# Patient Record
Sex: FEMALE | Race: WHITE | NOT HISPANIC OR LATINO | URBAN - METROPOLITAN AREA
[De-identification: names, ages, dates, MRNs, and addresses within clinical notes are randomized per-mention and may not be internally consistent; named-entity substitution may affect disease eponyms.]

---

## 2017-02-20 ENCOUNTER — EMERGENCY (EMERGENCY)
Facility: HOSPITAL | Age: 82
LOS: 0 days | Discharge: HOME | End: 2017-02-20

## 2017-06-27 DIAGNOSIS — E78.00 PURE HYPERCHOLESTEROLEMIA, UNSPECIFIED: ICD-10-CM

## 2017-06-27 DIAGNOSIS — F03.90 UNSPECIFIED DEMENTIA, UNSPECIFIED SEVERITY, WITHOUT BEHAVIORAL DISTURBANCE, PSYCHOTIC DISTURBANCE, MOOD DISTURBANCE, AND ANXIETY: ICD-10-CM
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
## 2017-06-27 DIAGNOSIS — S52.512A DISPLACED FRACTURE OF LEFT RADIAL STYLOID PROCESS, INITIAL ENCOUNTER FOR CLOSED FRACTURE: ICD-10-CM
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
## 2017-06-27 DIAGNOSIS — M25.432 EFFUSION, LEFT WRIST: ICD-10-CM

## 2017-06-27 DIAGNOSIS — Z88.0 ALLERGY STATUS TO PENICILLIN: ICD-10-CM

## 2017-06-27 DIAGNOSIS — S52.612A DISPLACED FRACTURE OF LEFT ULNA STYLOID PROCESS, INITIAL ENCOUNTER FOR CLOSED FRACTURE: ICD-10-CM
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
## 2019-09-21 ENCOUNTER — EMERGENCY (EMERGENCY)
Facility: HOSPITAL | Age: 84
LOS: 0 days | Discharge: HOME | End: 2019-09-22
Attending: EMERGENCY MEDICINE | Admitting: EMERGENCY MEDICINE
Payer: MEDICARE

## 2019-09-21 VITALS
HEIGHT: 61 IN | TEMPERATURE: 98 F | DIASTOLIC BLOOD PRESSURE: 69 MMHG | WEIGHT: 139.99 LBS | HEART RATE: 86 BPM | OXYGEN SATURATION: 95 % | SYSTOLIC BLOOD PRESSURE: 161 MMHG

## 2019-09-21 DIAGNOSIS — G30.9 ALZHEIMER'S DISEASE, UNSPECIFIED: ICD-10-CM

## 2019-09-21 DIAGNOSIS — F02.80 DEMENTIA IN OTHER DISEASES CLASSIFIED ELSEWHERE, UNSPECIFIED SEVERITY, WITHOUT BEHAVIORAL DISTURBANCE, PSYCHOTIC DISTURBANCE, MOOD DISTURBANCE, AND ANXIETY: ICD-10-CM
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
## 2019-09-21 DIAGNOSIS — Z88.0 ALLERGY STATUS TO PENICILLIN: ICD-10-CM
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
## 2019-09-21 DIAGNOSIS — S00.01XA ABRASION OF SCALP, INITIAL ENCOUNTER: ICD-10-CM

## 2019-09-21 DIAGNOSIS — Z23 ENCOUNTER FOR IMMUNIZATION: ICD-10-CM
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
## 2019-09-21 DIAGNOSIS — S09.90XA UNSPECIFIED INJURY OF HEAD, INITIAL ENCOUNTER: ICD-10-CM
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
## 2019-09-21 PROCEDURE — 72170 X-RAY EXAM OF PELVIS: CPT | Mod: 26

## 2019-09-21 PROCEDURE — 70450 CT HEAD/BRAIN W/O DYE: CPT | Mod: 26

## 2019-09-21 PROCEDURE — 99284 EMERGENCY DEPT VISIT MOD MDM: CPT

## 2019-09-21 PROCEDURE — 72125 CT NECK SPINE W/O DYE: CPT | Mod: 26

## 2019-09-21 PROCEDURE — 71045 X-RAY EXAM CHEST 1 VIEW: CPT | Mod: 26

## 2019-09-21 RX ORDER — TETANUS TOXOID, REDUCED DIPHTHERIA TOXOID AND ACELLULAR PERTUSSIS VACCINE, ADSORBED 5; 2.5; 8; 8; 2.5 [IU]/.5ML; [IU]/.5ML; UG/.5ML; UG/.5ML; UG/.5ML
0.5 SUSPENSION INTRAMUSCULAR ONCE
Refills: 0 | Status: COMPLETED | OUTPATIENT
Start: 2019-09-21 | End: 2019-09-21

## 2019-09-21 RX ADMIN — TETANUS TOXOID, REDUCED DIPHTHERIA TOXOID AND ACELLULAR PERTUSSIS VACCINE, ADSORBED 0.5 MILLILITER(S): 5; 2.5; 8; 8; 2.5 SUSPENSION INTRAMUSCULAR at 21:24

## 2019-09-21 NOTE — ED PROVIDER NOTE - PHYSICAL EXAMINATION
CONST: chronically ill appearing.   HEAD: + 1 cm laceration right parietal scalp, no FB, no step off or deformity, no raccoon eyes, negative pelaez's sign.   EYES: Sclera and conjunctiva clear.  NECK: No midline C/T/L tenderness  CARD: Normal S1 S2; Normal rate and rhythm  RESP: Equal BS B/L, No wheezes, rhonchi or rales. No distress  GI: Soft, non-tender, non-distended.  MS: no TTP of extremities/back/ribs, no signs of trauma.  Normal ROM in all extremities. No edema of lower extremities, no calf pain, radial pulses 2+ bilaterally  SKIN: Warm, dry, no acute rashes. Good turgor  NEURO: non-verbal.  not interactive. at baseline per family. CONST: chronically ill appearing.   HEAD: + 1 cm abrasion right parietal scalp, no FB, no step off or deformity, no raccoon eyes, negative pelaez's sign.   EYES: Sclera and conjunctiva clear.  NECK: No midline C/T/L tenderness  CARD: Normal S1 S2; Normal rate and rhythm  RESP: Equal BS B/L, No wheezes, rhonchi or rales. No distress  GI: Soft, non-tender, non-distended.  MS: no TTP of extremities/back/ribs, no signs of trauma.  Normal ROM in all extremities. No edema of lower extremities, no calf pain, radial pulses 2+ bilaterally  SKIN: Warm, dry, no acute rashes. Good turgor  NEURO: non-verbal.  not interactive. at baseline per family.

## 2019-09-21 NOTE — ED PROVIDER NOTE - OBJECTIVE STATEMENT
86 y.o female w/ hx of alzheimer's, HLD presents to the ED for evaluation of fall.  Pt was being transferred to toilet, fell and hit right side of head with no LOC.  Since falling acting at baseline.  Not complaining of any other pain. I am unable to obtain a comprehensive history, review of systems, past medical history, and/or physical exam due to constraints imposed by the urgency of the patient's clinical condition and/or mental status. pt nonverbal at baseline.

## 2019-09-21 NOTE — ED PROVIDER NOTE - NSFOLLOWUPINSTRUCTIONS_ED_ALL_ED_FT
Head Injury    WHAT YOU NEED TO KNOW:    A head injury is most often caused by a blow to the head. This may occur from a fall, bicycle injury, sports injury, being struck in the head, or a motor vehicle accident.     DISCHARGE INSTRUCTIONS:    Call 911 or have someone else call for any of the following:     You cannot be woken.      You have a seizure.      You stop responding to others or you faint.      You have blurry or double vision.      Your speech becomes slurred or confused.      You have arm or leg weakness, loss of feeling, or new problems with coordination.      Your pupils are larger than usual or one pupil is a different size than the other.       You have blood or clear fluid coming out of your ears or nose.    Return to the emergency department if:     You have repeated or forceful vomiting.      You feel confused.      Your headache gets worse or becomes severe.      You or someone caring for you notices that you are harder to wake than usual.    Contact your healthcare provider if:     Your symptoms last longer than 6 weeks after the injury.      You have questions or concerns about your condition or care.    Medicines:     Acetaminophen decreases pain. Acetaminophen is available without a doctor's order. Ask how much to take and how often to take it. Follow directions. Acetaminophen can cause liver damage if not taken correctly.      Take your medicine as directed. Contact your healthcare provider if you think your medicine is not helping or if you have side effects. Tell him or her if you are allergic to any medicine. Keep a list of the medicines, vitamins, and herbs you take. Include the amounts, and when and why you take them. Bring the list or the pill bottles to follow-up visits. Carry your medicine list with you in case of an emergency.    Self-care:     Rest or do quiet activities for 24 to 48 hours. Limit your time watching TV, using the computer, or doing tasks that require a lot of thinking. Slowly return to your normal activities as directed. Do not play sports or do activities that may cause you to get hit in the head. Ask your healthcare provider when you can return to sports.       Apply ice on your head for 15 to 20 minutes every hour or as directed. Use an ice pack, or put crushed ice in a plastic bag. Cover it with a towel before you apply it to your skin. Ice helps prevent tissue damage and decreases swelling and pain.       Have someone stay with you for 24 hours or as directed. This person can monitor you for complications and call 911. When you are awake the person should ask you a few questions to see if you are thinking clearly. An example would be to ask your name or your address.     Prevent another head injury:     Wear a helmet that fits properly. Do this when you play sports, or ride a bike, scooter, or skateboard. Helmets help decrease your risk of a serious head injury. Talk to your healthcare provider about other ways you can protect yourself if you play sports.      Wear your seat belt every time you are in a car. This helps to decrease your risk for a head injury if you are in a car accident.     Follow up with your healthcare provider as directed: Write down your questions so you remember to ask them during your visits.    Laceration    A laceration is a cut that goes through all of the layers of the skin and into the tissue that is right under the skin. Some lacerations heal on their own. Others need to be closed with skin adhesive strips, skin glue, stitches (sutures), or staples. Proper laceration care minimizes the risk of infection and helps the laceration to heal better.  If non-absorbable stitches or staples have been placed, they must be taken out within the time frame instructed by your healthcare provider.    SEEK IMMEDIATE MEDICAL CARE IF YOU HAVE ANY OF THE FOLLOWING SYMPTOMS: swelling around the wound, worsening pain, drainage from the wound, red streaking going away from your wound, inability to move finger or toe near the laceration, or discoloration of skin near the laceration.    staple removal in 7 days.  Follow up with your primary medical doctor in 1-2 days Head Injury    WHAT YOU NEED TO KNOW:    A head injury is most often caused by a blow to the head. This may occur from a fall, bicycle injury, sports injury, being struck in the head, or a motor vehicle accident.     DISCHARGE INSTRUCTIONS:    Call 911 or have someone else call for any of the following:     You cannot be woken.      You have a seizure.      You stop responding to others or you faint.      You have blurry or double vision.      Your speech becomes slurred or confused.      You have arm or leg weakness, loss of feeling, or new problems with coordination.      Your pupils are larger than usual or one pupil is a different size than the other.       You have blood or clear fluid coming out of your ears or nose.    Return to the emergency department if:     You have repeated or forceful vomiting.      You feel confused.      Your headache gets worse or becomes severe.      You or someone caring for you notices that you are harder to wake than usual.    Contact your healthcare provider if:     Your symptoms last longer than 6 weeks after the injury.      You have questions or concerns about your condition or care.    Medicines:     Acetaminophen decreases pain. Acetaminophen is available without a doctor's order. Ask how much to take and how often to take it. Follow directions. Acetaminophen can cause liver damage if not taken correctly.      Take your medicine as directed. Contact your healthcare provider if you think your medicine is not helping or if you have side effects. Tell him or her if you are allergic to any medicine. Keep a list of the medicines, vitamins, and herbs you take. Include the amounts, and when and why you take them. Bring the list or the pill bottles to follow-up visits. Carry your medicine list with you in case of an emergency.    Self-care:     Rest or do quiet activities for 24 to 48 hours. Limit your time watching TV, using the computer, or doing tasks that require a lot of thinking. Slowly return to your normal activities as directed. Do not play sports or do activities that may cause you to get hit in the head. Ask your healthcare provider when you can return to sports.       Apply ice on your head for 15 to 20 minutes every hour or as directed. Use an ice pack, or put crushed ice in a plastic bag. Cover it with a towel before you apply it to your skin. Ice helps prevent tissue damage and decreases swelling and pain.       Have someone stay with you for 24 hours or as directed. This person can monitor you for complications and call 911. When you are awake the person should ask you a few questions to see if you are thinking clearly. An example would be to ask your name or your address.     Prevent another head injury:     Wear a helmet that fits properly. Do this when you play sports, or ride a bike, scooter, or skateboard. Helmets help decrease your risk of a serious head injury. Talk to your healthcare provider about other ways you can protect yourself if you play sports.      Wear your seat belt every time you are in a car. This helps to decrease your risk for a head injury if you are in a car accident.     Follow up with your healthcare provider as directed: Write down your questions so you remember to ask them during your visits.    Abrasion    WHAT YOU NEED TO KNOW:    An abrasion is a scrape on your skin. It happens when your skin rubs against a rough surface. Some examples of an abrasion include rug burn, a skinned elbow, or road rash. Abrasions can be many shapes and sizes. The wound may hurt, bleed, bruise, or swell.     DISCHARGE INSTRUCTIONS:    Return to the emergency department if:     The bleeding does not stop after 10 minutes of firm pressure.      You cannot rinse one or more foreign objects out of your wound.      You have red streaks on your skin coming from your wound.    Contact your healthcare provider if:     You have a fever or chills.       Your abrasion is red, warm, swollen, or draining pus.      You have questions or concerns about your condition or care.    Care for your abrasion:     Wash your hands and dry them with a clean towel.      Press a clean cloth against your wound to stop any bleeding.      Rinse your wound with a lot of clean water. Do not use harsh soap, alcohol, or iodine solutions.      Use a clean, wet cloth to remove any objects, such as small pieces of rocks or dirt.      Rub antibiotic ointment on your wound. This may help prevent infection and help your wound heal.      Cover the wound with a non-stick bandage. Change the bandage daily, and if gets wet or dirty.     Follow up with your healthcare provider as directed: Write down your questions so you remember to ask them during your visits.        Follow up with your primary medical doctor in 1-2 days

## 2019-09-21 NOTE — ED ADULT NURSE NOTE - NSIMPLEMENTINTERV_GEN_ALL_ED
Implemented All Fall with Harm Risk Interventions:  [place] to call system. Call bell, personal items and telephone within reach. Instruct patient to call for assistance. Room bathroom lighting operational. Non-slip footwear when patient is off stretcher. Physically safe environment: no spills, clutter or unnecessary equipment. Stretcher in lowest position, wheels locked, appropriate side rails in place. Provide visual cue, wrist band, yellow gown, etc. Monitor gait and stability. Monitor for mental status changes and reorient to person, place, and time. Review medications for side effects contributing to fall risk. Reinforce activity limits and safety measures with patient and family. Provide visual clues: red socks.

## 2019-09-21 NOTE — ED PROVIDER NOTE - CLINICAL SUMMARY MEDICAL DECISION MAKING FREE TEXT BOX
Head and cspine CT negative. Pelvis XR and CXR negative. Given tetanus prophylaxis. Will D/C to follow up with PCP.

## 2019-09-21 NOTE — ED PROVIDER NOTE - ATTENDING CONTRIBUTION TO CARE
I personally evaluated the patient. I reviewed the Resident’s or Physician Assistant’s note (as assigned above), and agree with the findings and plan except as documented in my note.  Chart reviewed. H/O dementia, S/P mechanical fall,  hit head. Exam shows alert patient HEENT abrasion right parietal scalp, neck non-tender, lungs clear, RR S1S2, abdomen soft Nt +BS, FROM, neuro no focal deficits.

## 2019-09-21 NOTE — ED PROVIDER NOTE - PROGRESS NOTE DETAILS
Discussed results with pt.  All questions were answered and return precautions discussed.  py is asx and comfortable at this time.  still acting at baseline. Unremarkable re-exam.  No further concerns at this time from pt/family.  Will follow up with PMD.  family understands and agrees with tx plan.

## 2019-09-21 NOTE — ED ADULT TRIAGE NOTE - CHIEF COMPLAINT QUOTE
pt had unwitnessed fall in bathroom as per daughter. pt hit head pt noted no have an abrasion to right side of head. Pt daughter denies mother is taking any blood thinners

## 2019-09-21 NOTE — ED PROVIDER NOTE - CARE PLAN
Principal Discharge DX:	Closed head injury  Secondary Diagnosis:	Scalp laceration Principal Discharge DX:	Closed head injury  Secondary Diagnosis:	Scalp abrasion

## 2019-09-21 NOTE — ED ADULT NURSE NOTE - CHPI ED NUR SYMPTOMS POS
pt had a unwitnessed fall while sitting on the toilet, daughter states that aide placed her on toilet and stepped out of bathroom with pt fell. Pt has a history of dementia and is baseline nonverbal and unable to provide history of event leading to fall. daughter states patient is at baseline behavior.,. Has laceration to top portion of head.

## 2019-09-21 NOTE — ED PROVIDER NOTE - PATIENT PORTAL LINK FT
You can access the FollowMyHealth Patient Portal offered by [facility] by registering at the following website: [email or web address]. By joining [facility]’s FollowMyHealth portal, you will also be able to view your health information using other applications (apps) compatible with our system.

## 2019-09-22 VITALS
SYSTOLIC BLOOD PRESSURE: 180 MMHG | HEART RATE: 78 BPM | TEMPERATURE: 98 F | OXYGEN SATURATION: 95 % | DIASTOLIC BLOOD PRESSURE: 95 MMHG | RESPIRATION RATE: 16 BRPM

## 2020-09-06 ENCOUNTER — INPATIENT (INPATIENT)
Facility: HOSPITAL | Age: 85
LOS: 1 days | Discharge: HOSPICE | End: 2020-09-08
Attending: INTERNAL MEDICINE | Admitting: INTERNAL MEDICINE
Payer: MEDICARE

## 2020-09-06 VITALS
RESPIRATION RATE: 17 BRPM | OXYGEN SATURATION: 98 % | SYSTOLIC BLOOD PRESSURE: 160 MMHG | DIASTOLIC BLOOD PRESSURE: 72 MMHG | WEIGHT: 110.01 LBS | TEMPERATURE: 97 F | HEART RATE: 112 BPM

## 2020-09-06 LAB
ALBUMIN SERPL ELPH-MCNC: 3.6 G/DL — SIGNIFICANT CHANGE UP (ref 3.5–5.2)
ALP SERPL-CCNC: 161 U/L — HIGH (ref 30–115)
ALT FLD-CCNC: 16 U/L — SIGNIFICANT CHANGE UP (ref 0–41)
ANION GAP SERPL CALC-SCNC: 10 MMOL/L — SIGNIFICANT CHANGE UP (ref 7–14)
APTT BLD: 35 SEC — SIGNIFICANT CHANGE UP (ref 27–39.2)
AST SERPL-CCNC: 31 U/L — SIGNIFICANT CHANGE UP (ref 0–41)
BASOPHILS # BLD AUTO: 0.03 K/UL — SIGNIFICANT CHANGE UP (ref 0–0.2)
BASOPHILS NFR BLD AUTO: 0.3 % — SIGNIFICANT CHANGE UP (ref 0–1)
BILIRUB SERPL-MCNC: 0.3 MG/DL — SIGNIFICANT CHANGE UP (ref 0.2–1.2)
BUN SERPL-MCNC: 19 MG/DL — SIGNIFICANT CHANGE UP (ref 10–20)
CALCIUM SERPL-MCNC: 9.4 MG/DL — SIGNIFICANT CHANGE UP (ref 8.5–10.1)
CHLORIDE SERPL-SCNC: 103 MMOL/L — SIGNIFICANT CHANGE UP (ref 98–110)
CO2 SERPL-SCNC: 27 MMOL/L — SIGNIFICANT CHANGE UP (ref 17–32)
CREAT SERPL-MCNC: 0.7 MG/DL — SIGNIFICANT CHANGE UP (ref 0.7–1.5)
EOSINOPHIL # BLD AUTO: 0.19 K/UL — SIGNIFICANT CHANGE UP (ref 0–0.7)
EOSINOPHIL NFR BLD AUTO: 2.2 % — SIGNIFICANT CHANGE UP (ref 0–8)
GLUCOSE SERPL-MCNC: 147 MG/DL — HIGH (ref 70–99)
HCT VFR BLD CALC: 43.3 % — SIGNIFICANT CHANGE UP (ref 37–47)
HGB BLD-MCNC: 12.8 G/DL — SIGNIFICANT CHANGE UP (ref 12–16)
IMM GRANULOCYTES NFR BLD AUTO: 0.2 % — SIGNIFICANT CHANGE UP (ref 0.1–0.3)
INR BLD: 1.03 RATIO — SIGNIFICANT CHANGE UP (ref 0.65–1.3)
LACTATE SERPL-SCNC: 1.2 MMOL/L — SIGNIFICANT CHANGE UP (ref 0.7–2)
LYMPHOCYTES # BLD AUTO: 1.82 K/UL — SIGNIFICANT CHANGE UP (ref 1.2–3.4)
LYMPHOCYTES # BLD AUTO: 20.9 % — SIGNIFICANT CHANGE UP (ref 20.5–51.1)
MCHC RBC-ENTMCNC: 27.6 PG — SIGNIFICANT CHANGE UP (ref 27–31)
MCHC RBC-ENTMCNC: 29.6 G/DL — LOW (ref 32–37)
MCV RBC AUTO: 93.3 FL — SIGNIFICANT CHANGE UP (ref 81–99)
MONOCYTES # BLD AUTO: 0.56 K/UL — SIGNIFICANT CHANGE UP (ref 0.1–0.6)
MONOCYTES NFR BLD AUTO: 6.4 % — SIGNIFICANT CHANGE UP (ref 1.7–9.3)
NEUTROPHILS # BLD AUTO: 6.07 K/UL — SIGNIFICANT CHANGE UP (ref 1.4–6.5)
NEUTROPHILS NFR BLD AUTO: 70 % — SIGNIFICANT CHANGE UP (ref 42.2–75.2)
NRBC # BLD: 0 /100 WBCS — SIGNIFICANT CHANGE UP (ref 0–0)
PLATELET # BLD AUTO: 374 K/UL — SIGNIFICANT CHANGE UP (ref 130–400)
POTASSIUM SERPL-MCNC: 4.3 MMOL/L — SIGNIFICANT CHANGE UP (ref 3.5–5)
POTASSIUM SERPL-SCNC: 4.3 MMOL/L — SIGNIFICANT CHANGE UP (ref 3.5–5)
PROT SERPL-MCNC: 6.9 G/DL — SIGNIFICANT CHANGE UP (ref 6–8)
PROTHROM AB SERPL-ACNC: 11.8 SEC — SIGNIFICANT CHANGE UP (ref 9.95–12.87)
RBC # BLD: 4.64 M/UL — SIGNIFICANT CHANGE UP (ref 4.2–5.4)
RBC # FLD: 14.1 % — SIGNIFICANT CHANGE UP (ref 11.5–14.5)
SODIUM SERPL-SCNC: 140 MMOL/L — SIGNIFICANT CHANGE UP (ref 135–146)
TROPONIN T SERPL-MCNC: <0.01 NG/ML — SIGNIFICANT CHANGE UP
WBC # BLD: 8.69 K/UL — SIGNIFICANT CHANGE UP (ref 4.8–10.8)
WBC # FLD AUTO: 8.69 K/UL — SIGNIFICANT CHANGE UP (ref 4.8–10.8)

## 2020-09-06 PROCEDURE — 73630 X-RAY EXAM OF FOOT: CPT | Mod: 26,LT

## 2020-09-06 PROCEDURE — 93010 ELECTROCARDIOGRAM REPORT: CPT

## 2020-09-06 PROCEDURE — 99221 1ST HOSP IP/OBS SF/LOW 40: CPT | Mod: AI,GC

## 2020-09-06 PROCEDURE — 71045 X-RAY EXAM CHEST 1 VIEW: CPT | Mod: 26

## 2020-09-06 PROCEDURE — 99285 EMERGENCY DEPT VISIT HI MDM: CPT

## 2020-09-06 RX ORDER — ENOXAPARIN SODIUM 100 MG/ML
40 INJECTION SUBCUTANEOUS DAILY
Refills: 0 | Status: DISCONTINUED | OUTPATIENT
Start: 2020-09-06 | End: 2020-09-08

## 2020-09-06 RX ORDER — SODIUM CHLORIDE 9 MG/ML
500 INJECTION, SOLUTION INTRAVENOUS
Refills: 0 | Status: DISCONTINUED | OUTPATIENT
Start: 2020-09-06 | End: 2020-09-07

## 2020-09-06 RX ADMIN — SODIUM CHLORIDE 250 MILLILITER(S): 9 INJECTION, SOLUTION INTRAVENOUS at 22:31

## 2020-09-06 NOTE — H&P ADULT - ASSESSMENT
86 YO F with PMHx of HLD and alzheimers, nonverbal at baseline, brought in by family for pressure ulcers on sacrum and left heel.    #Stage II sacral ulcer  #Left heel ulcer  - No signs of infection. Will hold off on antibiotics for now  - Burn consult for wound care  - Physiatry, PT/OT    #Alzheimer Dementia - Not on any home meds  #DLD - Not on any meds. Follow lipid profile    #Misc  - DVT Prophylaxis: Lovenox  - Diet: NPO pending speech and swallow  - GI Prophylaxis: Not indicated  - Activity: AAT  - Code Status: Full code     Dispo: pending burn consult. Physiatry. PT/OT

## 2020-09-06 NOTE — ED PROVIDER NOTE - ATTENDING CONTRIBUTION TO CARE
Attending Contribution to Care: I personally evaluated the patient. I reviewed the Resident’s or Physician Assistant’s note (as assigned above), and agree with the findings and plan except as documented in my note.    Pt is an 86 y/o female with advanced dementia, nonverbal, bedbound, presents for sores found to buttocks and left heel. Sx's are mild, constant. No vomiting, diarrhea, fever, SOB. Limited hx due to pt being nonverbal. PT lives at home, has aids, no VNS.    Constitutional: Well developed. Thin. Nonverbal. Contracted.  Head: Normocephalic, atraumatic.  Eyes: PERRL. EOMI.  ENT: No nasal discharge. Mucous membranes moist.  Neck: Supple. Painless ROM.  Cardiovascular: Normal S1, S2. Regular rate and rhythm. No murmurs, rubs, or gallops.  Pulmonary: Normal respiratory rate and effort. Lungs clear to auscultation bilaterally. No wheezing, rales, or rhonchi.  Abdominal: Soft. Nondistended. Nontender. No rebound, guarding, rigidity.  Extremities. Pelvis stable. No lower extremity edema, symmetric calves.  Skin: No rashes, cyanosis. + unstable ulcer to left heel. stage 2 ulcer to sacrum.  Neuro: Nonverbal. Contracted.  Psych: Unable to assess.

## 2020-09-06 NOTE — ED PROVIDER NOTE - OBJECTIVE STATEMENT
86 yo F with PMH of HLD and alzheimers, nonverbal at baseline, brought in by family for pressure ulcers. Daughter is at bedside and providing history. Patient lives at home with 24/7 aids. The aids noticed ulcers on her L heel and buttocks a few days ago, but are unsure of exactly when they started. Patient has not shown any signs of having pain at home. No fevers, diarrhea, hematuria, hematochezia, melena, or changes to mental status.

## 2020-09-06 NOTE — ED ADULT TRIAGE NOTE - CHIEF COMPLAINT QUOTE
Family called EMS for patient when they noticed that she has bed sores to the left ankle, left groin, and lower back/buttocks. Pt has dementia and is a poor historian, None ambulatory.

## 2020-09-06 NOTE — ED PROVIDER NOTE - NS ED MD DISPO ISOLATION TYPES
Refill Request:  Medication: Multiple refills  Last seen: 05/13/2019  Next appointment: 08/23/2019  script e prescribe into pharmacy per v/o MD and [facility] protocol.      
None

## 2020-09-06 NOTE — H&P ADULT - ATTENDING COMMENTS
HPI:  86 YO F with PMHx of HLD and Alzheimers, nonverbal at baseline, brought in by family for pressure ulcers on sacrum and left heel. History obtained from daughter over the phone. Patient lives at home with 24/7 aide. The aide noticed ulcers on her L heel and buttocks a few days ago, but are unsure of exactly when they started. Daughter also reports not being satisfied with the aide and wants better placement for her mother.   As per daughter no fevers, diarrhea, hematuria, hematochezia, melena, or changes to mental status.  In the ED vitals were /72 mmHg,  bpm, temp 97.4. (06 Sep 2020 23:31)    REVIEW OF SYSTEMS: see cc/HPI  CONSTITUTIONAL: No weakness, fevers or chills  EYES/ENT: No visual changes;  No vertigo or throat pain   NECK: No pain or stiffness  RESPIRATORY: No cough, wheezing, hemoptysis; No shortness of breath  CARDIOVASCULAR: No chest pain or palpitations  GASTROINTESTINAL: No abdominal or epigastric pain. No nausea, vomiting, or hematemesis; No diarrhea or constipation. No melena or hematochezia.  GENITOURINARY: No dysuria, frequency or hematuria  NEUROLOGICAL: No numbness or weakness, (+) dementia / non-verbal   SKIN: No itching, rashes, (+) sacral decubitus (+) heel ulceration     Physical Exam:  General: WN/WD NAD  Neurology: A&Ox3, nonfocal, follows commands  Eyes: PERRLA/ EOMI  ENT/Neck: Neck supple, trachea midline, No JVD  Respiratory: CTA B/L, No wheezing, rales, rhonchi  CV: Normal rate regular rhythm, S1S2, no murmurs, rubs or gallops  Abdominal: Soft, NT, ND +BS,   Extremities: No edema, + peripheral pulses  Skin: No Rashes, Hematoma, Ecchymosis  Incisions: n/a  Tubes: n/a    A/p  Pressure ulcerations/ Sacral and Heel ulcer   -Local wound care   -Burn eval   -Case management for arranging would care at home upon D/C     Alzheimer's dementia   -fall precautions    Dyslipidemia   -no Rx    DVT prophylaxis

## 2020-09-06 NOTE — H&P ADULT - NSHPLABSRESULTS_GEN_ALL_CORE
VITAL SIGNS: Last 24 Hours  T(C): 37 (06 Sep 2020 19:55), Max: 37 (06 Sep 2020 19:55)  T(F): 98.6 (06 Sep 2020 19:55), Max: 98.6 (06 Sep 2020 19:55)  HR: 112 (06 Sep 2020 19:12) (112 - 112)  BP: 160/72 (06 Sep 2020 19:12) (160/72 - 160/72)  BP(mean): --  RR: 17 (06 Sep 2020 19:12) (17 - 17)  SpO2: 98% (06 Sep 2020 19:12) (98% - 98%)    LABS:                        12.8   8.69  )-----------( 374      ( 06 Sep 2020 19:43 )             43.3     09-06    140  |  103  |  19  ----------------------------<  147<H>  4.3   |  27  |  0.7    Ca    9.4      06 Sep 2020 19:43    TPro  6.9  /  Alb  3.6  /  TBili  0.3  /  DBili  x   /  AST  31  /  ALT  16  /  AlkPhos  161<H>  09-06    PT/INR - ( 06 Sep 2020 19:43 )   PT: 11.80 sec;   INR: 1.03 ratio         PTT - ( 06 Sep 2020 19:43 )  PTT:35.0 sec      Troponin T, Serum: <0.01 ng/mL (09-06-20 @ 19:43)  Lactate, Blood: 1.2 mmol/L (09-06-20 @ 19:43)      CARDIAC MARKERS ( 06 Sep 2020 19:43 )  x     / <0.01 ng/mL / x     / x     / x          RADIOLOGY:

## 2020-09-06 NOTE — ED PROVIDER NOTE - CLINICAL SUMMARY MEDICAL DECISION MAKING FREE TEXT BOX
Pt presented to ED for decub ulcers, poor outpt care. Labs, imaging obtained. Pt afebrile, no evidence of infection. Will admit.

## 2020-09-06 NOTE — H&P ADULT - HISTORY OF PRESENT ILLNESS
86 YO F with PMHx of HLD and alzheimers, nonverbal at baseline, brought in by family for pressure ulcers on sacrum and left heel. History obtained from daughter over the phone. Patient lives at home with 24/7 aide. The aide noticed ulcers on her L heel and buttocks a few days ago, but are unsure of exactly when they started. Daughter also reports not being satisfied with the aide and wants better placement for her mother.   As per daughter no fevers, diarrhea, hematuria, hematochezia, melena, or changes to mental status.  In the ED vitals were /72 mmHg,  bpm, temp 97.4.

## 2020-09-06 NOTE — ED ADULT NURSE NOTE - OBJECTIVE STATEMENT
Family called EMS for patient when they noticed that she has bed sores to the left ankle, left groin, and lower back/buttocks. Pt has dementia and is a poor historian, None ambulatory. nonverbal

## 2020-09-06 NOTE — H&P ADULT - NSHPPHYSICALEXAM_GEN_ALL_CORE
CONSTITUTIONAL: AAO x 0  HEAD: Atraumatic, normocephalic  EYES: EOM intact, PERRLA, conjunctiva and sclera clear  ENT: Supple, no masses, no thyromegaly, no bruits, no JVD; moist mucous membranes  PULMONARY: Clear to auscultation bilaterally  CARDIOVASCULAR: Regular rate and rhythm; no murmurs, rubs, or gallops  GASTROINTESTINAL: Soft, non-tender, non-distended; bowel sounds present  MUSCULOSKELETAL/SKIN: 4 cm ulcer on L heel, Stage 2 sacral ulcer

## 2020-09-06 NOTE — ED PROVIDER NOTE - PHYSICAL EXAMINATION
CONSTITUTIONAL: frail woman, laying rigidly in bed  SKIN: Warm dry, stage 2 sacral decub 7.5 cm   HEAD: NCAT  EYES: EOMI, PERRLA, no scleral icterus, conjunctiva pink  ENT: dry mucous membranes with no erythema or exudates  NECK: non tender. Full ROM.  CARD: tachycardic, normal rhythm, no murmurs.  RESP: clear to ausculation b/l. No crackles or wheezing.  ABD: soft, non-tender, non-distended, no rebound or guarding.  EXT: rigid extremities  no pedal edema, no calf tenderness, 4 cm unstagable ulcer on L heel   NEURO: unable to attain   PSYCH: nonverbal, unresponsive, baseline

## 2020-09-07 PROBLEM — E78.00 PURE HYPERCHOLESTEROLEMIA, UNSPECIFIED: Chronic | Status: ACTIVE | Noted: 2019-09-21

## 2020-09-07 PROBLEM — F03.90 UNSPECIFIED DEMENTIA WITHOUT BEHAVIORAL DISTURBANCE: Chronic | Status: ACTIVE | Noted: 2019-09-21

## 2020-09-07 LAB
A1C WITH ESTIMATED AVERAGE GLUCOSE RESULT: 6 % — HIGH (ref 4–5.6)
BASOPHILS # BLD AUTO: 0.05 K/UL — SIGNIFICANT CHANGE UP (ref 0–0.2)
BASOPHILS NFR BLD AUTO: 0.5 % — SIGNIFICANT CHANGE UP (ref 0–1)
EOSINOPHIL # BLD AUTO: 0.23 K/UL — SIGNIFICANT CHANGE UP (ref 0–0.7)
EOSINOPHIL NFR BLD AUTO: 2.3 % — SIGNIFICANT CHANGE UP (ref 0–8)
ESTIMATED AVERAGE GLUCOSE: 126 MG/DL — HIGH (ref 68–114)
HCT VFR BLD CALC: 42.6 % — SIGNIFICANT CHANGE UP (ref 37–47)
HGB BLD-MCNC: 12.6 G/DL — SIGNIFICANT CHANGE UP (ref 12–16)
IMM GRANULOCYTES NFR BLD AUTO: 0.3 % — SIGNIFICANT CHANGE UP (ref 0.1–0.3)
LYMPHOCYTES # BLD AUTO: 2.66 K/UL — SIGNIFICANT CHANGE UP (ref 1.2–3.4)
LYMPHOCYTES # BLD AUTO: 27.1 % — SIGNIFICANT CHANGE UP (ref 20.5–51.1)
MCHC RBC-ENTMCNC: 28 PG — SIGNIFICANT CHANGE UP (ref 27–31)
MCHC RBC-ENTMCNC: 29.6 G/DL — LOW (ref 32–37)
MCV RBC AUTO: 94.7 FL — SIGNIFICANT CHANGE UP (ref 81–99)
MONOCYTES # BLD AUTO: 0.71 K/UL — HIGH (ref 0.1–0.6)
MONOCYTES NFR BLD AUTO: 7.2 % — SIGNIFICANT CHANGE UP (ref 1.7–9.3)
NEUTROPHILS # BLD AUTO: 6.12 K/UL — SIGNIFICANT CHANGE UP (ref 1.4–6.5)
NEUTROPHILS NFR BLD AUTO: 62.6 % — SIGNIFICANT CHANGE UP (ref 42.2–75.2)
NRBC # BLD: 0 /100 WBCS — SIGNIFICANT CHANGE UP (ref 0–0)
PLATELET # BLD AUTO: 310 K/UL — SIGNIFICANT CHANGE UP (ref 130–400)
RBC # BLD: 4.5 M/UL — SIGNIFICANT CHANGE UP (ref 4.2–5.4)
RBC # FLD: 13.9 % — SIGNIFICANT CHANGE UP (ref 11.5–14.5)
SARS-COV-2 RNA SPEC QL NAA+PROBE: SIGNIFICANT CHANGE UP
WBC # BLD: 9.8 K/UL — SIGNIFICANT CHANGE UP (ref 4.8–10.8)
WBC # FLD AUTO: 9.8 K/UL — SIGNIFICANT CHANGE UP (ref 4.8–10.8)

## 2020-09-07 PROCEDURE — 99232 SBSQ HOSP IP/OBS MODERATE 35: CPT

## 2020-09-07 PROCEDURE — 99231 SBSQ HOSP IP/OBS SF/LOW 25: CPT

## 2020-09-07 RX ORDER — SODIUM CHLORIDE 9 MG/ML
1000 INJECTION INTRAMUSCULAR; INTRAVENOUS; SUBCUTANEOUS
Refills: 0 | Status: DISCONTINUED | OUTPATIENT
Start: 2020-09-07 | End: 2020-09-08

## 2020-09-07 RX ORDER — MORPHINE SULFATE 50 MG/1
2 CAPSULE, EXTENDED RELEASE ORAL ONCE
Refills: 0 | Status: DISCONTINUED | OUTPATIENT
Start: 2020-09-07 | End: 2020-09-07

## 2020-09-07 RX ORDER — COLLAGENASE CLOSTRIDIUM HIST. 250 UNIT/G
1 OINTMENT (GRAM) TOPICAL
Refills: 0 | Status: DISCONTINUED | OUTPATIENT
Start: 2020-09-07 | End: 2020-09-08

## 2020-09-07 RX ADMIN — ENOXAPARIN SODIUM 40 MILLIGRAM(S): 100 INJECTION SUBCUTANEOUS at 12:08

## 2020-09-07 RX ADMIN — MORPHINE SULFATE 2 MILLIGRAM(S): 50 CAPSULE, EXTENDED RELEASE ORAL at 16:33

## 2020-09-07 RX ADMIN — MORPHINE SULFATE 2 MILLIGRAM(S): 50 CAPSULE, EXTENDED RELEASE ORAL at 18:31

## 2020-09-07 RX ADMIN — Medication 1 APPLICATION(S): at 17:04

## 2020-09-07 RX ADMIN — SODIUM CHLORIDE 60 MILLILITER(S): 9 INJECTION INTRAMUSCULAR; INTRAVENOUS; SUBCUTANEOUS at 12:13

## 2020-09-07 NOTE — SWALLOW BEDSIDE ASSESSMENT ADULT - SWALLOW EVAL: DIAGNOSIS
PO trials not appropriate 2' pt lethargic; Pt awake however eyes remained closed throughout evaluation. SLP attempted dry spoon however pt refused to open mouth despite max cues

## 2020-09-07 NOTE — CONSULT NOTE ADULT - SUBJECTIVE AND OBJECTIVE BOX
87y  Female  HPI:  88 YO F with PMHx of HLD and alzheimers, nonverbal at baseline, brought in by family for pressure ulcers on sacrum and left heel. History obtained from daughter over the phone. Patient lives at home with 24/7 aide. The aide noticed ulcers on her L heel and buttocks a few days ago, but are unsure of exactly when they started. Daughter also reports not being satisfied with the aide and wants better placement for her mother.   As per daughter no fevers, diarrhea, hematuria, hematochezia, melena, or changes to mental status.  In the ED vitals were /72 mmHg,  bpm, temp 97.4. (06 Sep 2020 23:31)    Hospital course***  Allergies    penicillins (Other)    Intolerances      PAST MEDICAL & SURGICAL HISTORY:  Dementia  High blood cholesterol      Labs:                        12.6   9.80  )-----------( 310      ( 07 Sep 2020 05:27 )             42.6     09-06    140  |  103  |  19  ----------------------------<  147<H>  4.3   |  27  |  0.7    Ca    9.4      06 Sep 2020 19:43    TPro  6.9  /  Alb  3.6  /  TBili  0.3  /  DBili  x   /  AST  31  /  ALT  16  /  AlkPhos  161<H>  09-06        PE:  PHYSICAL EXAM: Awake, alert  partial thickness wound to sacrum and full thickness wound to left heel with black dry eschar

## 2020-09-07 NOTE — CONSULT NOTE ADULT - ASSESSMENT
ASSESSMENT:  Stage  II  pressure ulcer to sacrum and Unstageable to left heel    RECOMMENDATION:  Wound care - Silvadene/ DPD BID to sacrum  Santyl/ wet gauze with NS/ Kerlex BID to left Heel  Offloading/ positional changes  Will follow.

## 2020-09-07 NOTE — PATIENT PROFILE ADULT - NSPRONUTRITIONRISK_GEN_A_NUR
Pressure injury stage 2 or greater/Significant decrease of oral intake greater than 3 days prior to admission

## 2020-09-07 NOTE — PHYSICAL THERAPY INITIAL EVALUATION ADULT - GENERAL OBSERVATIONS, REHAB EVAL
Pt is 88 y/o F with PMHx of HLD and alzheimers, nonverbal at baseline, encountered in semi-kay position in bed alert. Pt does not follow command via verbal and tactile cues. Pt is not candidate for skilled PT at this time secondary cognition, doesn't follow instruction and exhibit resistance to passive movement. Pt will refer to  nursing care for proper bed positioning/turning to relieve pressures. PT to f/u when appropriate.

## 2020-09-07 NOTE — CONSULT NOTE ADULT - SUBJECTIVE AND OBJECTIVE BOX
T(C): 36.1 (09-07-20 @ 07:57), Max: 37 (09-06-20 @ 19:55)  HR: 66 (09-07-20 @ 07:57) (64 - 112)  BP: 135/66 (09-07-20 @ 07:57) (135/66 - 163/70)  RR: 18 (09-07-20 @ 07:57) (17 - 18)  SpO2: 97% (09-07-20 @ 07:57) (95% - 98%)    MOTOR EXAM      Physical Medicine And Rehabilitation Services are not indicated in this patient for the following Reason(s):    [    ] Patient is medically unstable      [    ]  Patient does not have appropriate activity orders      [     ] Patient has no weight bearing status for:      [     ] Patient is independently ambulating      [     ]  Patient is from Skilled Nursing Facility and is appropriate to return      [     ] Patient was non-ambulatory prior to admission      [xx     ]  Other  - Patient has severe dementia, nonverabal and and follows no commands. She was unable to work with a therapist and not appropriate for rehab intervention      [facility] PM&R / PT request

## 2020-09-07 NOTE — PROGRESS NOTE ADULT - SUBJECTIVE AND OBJECTIVE BOX
SUBJECTIVE:    Patient is a 87y old Female who presents with a chief complaint of Sacral and heel ulcer (06 Sep 2020 23:31)    Overnight Events: Patient is stable, no acute events overnight.  VS are stable, patient is non verbal and does not follow commands.    PAST MEDICAL & SURGICAL HISTORY  Dementia  High blood cholesterol    SOCIAL HISTORY:  Negative for smoking/alcohol/drug use.     ALLERGIES:  penicillins (Other)    MEDICATIONS:  STANDING MEDICATIONS  enoxaparin Injectable 40 milliGRAM(s) SubCutaneous daily  lactated ringers. 500 milliLiter(s) IV Continuous <Continuous>    PRN MEDICATIONS    VITALS:   T(F): 97, Max: 98.6 (09-06-20 @ 19:55)  HR: 66 (64 - 112)  BP: 135/66 (135/66 - 163/70)  RR: 18 (17 - 18)  SpO2: 97% (95% - 98%)    LABS:                        12.6   9.80  )-----------( 310      ( 07 Sep 2020 05:27 )             42.6     09-06    140  |  103  |  19  ----------------------------<  147<H>  4.3   |  27  |  0.7    Ca    9.4      06 Sep 2020 19:43    TPro  6.9  /  Alb  3.6  /  TBili  0.3  /  DBili  x   /  AST  31  /  ALT  16  /  AlkPhos  161<H>  09-06    PT/INR - ( 06 Sep 2020 19:43 )   PT: 11.80 sec;   INR: 1.03 ratio         PTT - ( 06 Sep 2020 19:43 )  PTT:35.0 sec      Troponin T, Serum: <0.01 ng/mL (09-06-20 @ 19:43)  Lactate, Blood: 1.2 mmol/L (09-06-20 @ 19:43)      CARDIAC MARKERS ( 06 Sep 2020 19:43 )  x     / <0.01 ng/mL / x     / x     / x                PHYSICAL EXAM:  GEN: NAD, comfortable  LUNGS: CTAB, no w/r/r  HEART: RRR, s1 and s2 appreciated, no m/r/g  ABD: soft, NT/ND, +BS  EXT: no edema, PP b/l  NEURO: AAOX3 SUBJECTIVE:    Patient is a 87y old Female who presents with a chief complaint of Sacral and heel ulcer (06 Sep 2020 23:31)    Overnight Events: Patient is stable, no acute events overnight.  VS are stable, patient is non verbal and does not follow commands.    PAST MEDICAL & SURGICAL HISTORY  Dementia  High blood cholesterol    SOCIAL HISTORY:  Negative for smoking/alcohol/drug use.     ALLERGIES:  penicillins (Other)    MEDICATIONS:  STANDING MEDICATIONS  enoxaparin Injectable 40 milliGRAM(s) SubCutaneous daily  lactated ringers. 500 milliLiter(s) IV Continuous <Continuous>    PRN MEDICATIONS    VITALS:   T(F): 97, Max: 98.6 (09-06-20 @ 19:55)  HR: 66 (64 - 112)  BP: 135/66 (135/66 - 163/70)  RR: 18 (17 - 18)  SpO2: 97% (95% - 98%)    LABS:                        12.6   9.80  )-----------( 310      ( 07 Sep 2020 05:27 )             42.6     09-06    140  |  103  |  19  ----------------------------<  147<H>  4.3   |  27  |  0.7    Ca    9.4      06 Sep 2020 19:43    TPro  6.9  /  Alb  3.6  /  TBili  0.3  /  DBili  x   /  AST  31  /  ALT  16  /  AlkPhos  161<H>  09-06    PT/INR - ( 06 Sep 2020 19:43 )   PT: 11.80 sec;   INR: 1.03 ratio         PTT - ( 06 Sep 2020 19:43 )  PTT:35.0 sec      Troponin T, Serum: <0.01 ng/mL (09-06-20 @ 19:43)  Lactate, Blood: 1.2 mmol/L (09-06-20 @ 19:43)      CARDIAC MARKERS ( 06 Sep 2020 19:43 )  x     / <0.01 ng/mL / x     / x     / x                PHYSICAL EXAM:  GEN: NAD, comfortable  LUNGS: CTAB, no w/r/r  HEART: RRR, s1 and s2 appreciated, no m/r/g  ABD: soft, NT/ND, +BS  EXT: no edema, PP b/l  NEURO: AAOX0, non verbal, does not follow commands, opens her eye on verbal stimuli SUBJECTIVE:    Patient is a 87y old Female who presents with a chief complaint of Sacral and heel ulcer (06 Sep 2020 23:31)    Overnight Events: Patient is stable, no acute events overnight.  VS are stable, patient is non verbal and does not follow commands.    PAST MEDICAL & SURGICAL HISTORY  Dementia  High blood cholesterol    SOCIAL HISTORY:  Negative for smoking/alcohol/drug use.     ALLERGIES:  penicillins (Other)    MEDICATIONS:  STANDING MEDICATIONS  enoxaparin Injectable 40 milliGRAM(s) SubCutaneous daily  lactated ringers. 500 milliLiter(s) IV Continuous <Continuous>    PRN MEDICATIONS    VITALS:   T(F): 97, Max: 98.6 (09-06-20 @ 19:55)  HR: 66 (64 - 112)  BP: 135/66 (135/66 - 163/70)  RR: 18 (17 - 18)  SpO2: 97% (95% - 98%)    LABS:                        12.6   9.80  )-----------( 310      ( 07 Sep 2020 05:27 )             42.6     09-06    140  |  103  |  19  ----------------------------<  147<H>  4.3   |  27  |  0.7    Ca    9.4      06 Sep 2020 19:43    TPro  6.9  /  Alb  3.6  /  TBili  0.3  /  DBili  x   /  AST  31  /  ALT  16  /  AlkPhos  161<H>  09-06    PT/INR - ( 06 Sep 2020 19:43 )   PT: 11.80 sec;   INR: 1.03 ratio         PTT - ( 06 Sep 2020 19:43 )  PTT:35.0 sec      Troponin T, Serum: <0.01 ng/mL (09-06-20 @ 19:43)  Lactate, Blood: 1.2 mmol/L (09-06-20 @ 19:43)      CARDIAC MARKERS ( 06 Sep 2020 19:43 )  x     / <0.01 ng/mL / x     / x     / x          PHYSICAL EXAM:  GEN: NAD, comfortable  LUNGS: CTAB, no w/r/r  HEART: RRR, s1 and s2 appreciated, no m/r/g  ABD: soft, NT/ND, +BS  EXT: no edema, PP b/l  NEURO: AAOX0, non verbal, does not follow commands, opens her eye on verbal stimuli  SKIN: pressure ulcer of sacrum; left heel with black dry eschar SUBJECTIVE:    Patient is a 87y old Female who presents with a chief complaint of Sacral and heel ulcer (06 Sep 2020 23:31)    Overnight Events: Patient is stable, no acute events overnight.  VS are stable, patient is non verbal and does not follow commands.    PAST MEDICAL & SURGICAL HISTORY  Dementia  High blood cholesterol    SOCIAL HISTORY:  Negative for smoking/alcohol/drug use.     ALLERGIES:  penicillins (Other)    MEDICATIONS:  STANDING MEDICATIONS  enoxaparin Injectable 40 milliGRAM(s) SubCutaneous daily  lactated ringers. 500 milliLiter(s) IV Continuous <Continuous>    PRN MEDICATIONS    VITALS:   T(F): 97, Max: 98.6 (09-06-20 @ 19:55)  HR: 66 (64 - 112)  BP: 135/66 (135/66 - 163/70)  RR: 18 (17 - 18)  SpO2: 97% (95% - 98%)    LABS:                        12.6   9.80  )-----------( 310      ( 07 Sep 2020 05:27 )             42.6     09-06    140  |  103  |  19  ----------------------------<  147<H>  4.3   |  27  |  0.7    Ca    9.4      06 Sep 2020 19:43    TPro  6.9  /  Alb  3.6  /  TBili  0.3  /  DBili  x   /  AST  31  /  ALT  16  /  AlkPhos  161<H>  09-06    PT/INR - ( 06 Sep 2020 19:43 )   PT: 11.80 sec;   INR: 1.03 ratio         PTT - ( 06 Sep 2020 19:43 )  PTT:35.0 sec      Troponin T, Serum: <0.01 ng/mL (09-06-20 @ 19:43)  Lactate, Blood: 1.2 mmol/L (09-06-20 @ 19:43)      CARDIAC MARKERS ( 06 Sep 2020 19:43 )  x     / <0.01 ng/mL / x     / x     / x          PHYSICAL EXAM:  GEN: frail  LUNGS: CTAB, no w/r/r  HEART: RRR, s1 and s2 appreciated, no m/r/g  ABD: soft, NT/ND, +BS  EXT: no edema, PP b/l  NEURO: AAOX0, non verbal, does not follow commands, opens her eye on painful stimuli  SKIN: pressure ulcer of sacrum; left heel with black dry eschar

## 2020-09-07 NOTE — PROGRESS NOTE ADULT - ASSESSMENT
86 YO F with PMHx of HLD and alzheimers, nonverbal at baseline, brought in by family for pressure ulcers on sacrum and left heel and for placement.    #Stage II sacral ulcer  #Left heel ulcer  - No signs of infection. Will hold off on antibiotics for now  - Burn consult for wound care  - Physiatry, PT/OT    #Alzheimer Dementia - Not on any home meds  #DLD - Not on any meds. Follow lipid profile    #Misc  - DVT Prophylaxis: Lovenox  - Diet: NPO pending speech and swallow  - GI Prophylaxis: Not indicated  - Activity: AAT  - Code Status: Full code     Dispo: pending burn consult. Physiatry. PT/OT 86 YO F with PMHx of HLD and alzheimers, nonverbal at baseline, brought in by family for pressure ulcers on sacrum and left heel and for placement.    # Stage  II  pressure ulcer to sacrum  # Un-stageable to left heel  - c/w Silvadene/ DPD BID to sacrum  - c/w Santyl/ wet gauze with NS/ Kerlex BID to left Heel  - Burn following     #Alzheimer Dementia - Not on any home meds    #DLD - Not on any meds. Follow lipid profile    #Misc  - DVT Prophylaxis: Lovenox  - Diet: NPO pending speech and swallow  - GI Prophylaxis: Not indicated  - Activity: AAT  - Code Status: Full code     Dispo: pending burn consult. Physiatry. PT/OT 88 YO F with PMHx of HLD and alzheimers, nonverbal at baseline, brought in by family for pressure ulcers on sacrum and left heel and for placement.    # Stage 2 pressure ulcer to sacrum  # Un-stageable to left heel  - c/w Silvadene/ DPD BID to sacrum  - c/w Santyl/ wet gauze with NS/ Kerlex BID to left Heel  - Burn following     #Alzheimer Dementia   - Not on any home meds    #DLD - Not on any meds. Follow lipid profile    #Misc  - DVT Prophylaxis: Lovenox  - Diet: NPO pending speech and swallow  - GI Prophylaxis: Not indicated  - Activity: AAT  - Code Status: Full code     Dispo: pending burn consult. Physiatry. PT/OT 88 YO F with PMHx of HLD and alzheimers, nonverbal at baseline, brought in by family for pressure ulcers on sacrum and left heel and for placement.    # Stage 2 pressure ulcer to sacrum  # Un-stageable to left heel  - c/w Silvadene/ DPD BID to sacrum  - c/w Santyl/ wet gauze with NS/ Kerlex BID to left Heel  - Burn following     #Alzheimer Dementia   - Not on any home meds    #DLD - Not on any meds. Follow lipid profile    #Misc  - DVT Prophylaxis: Lovenox  - Diet: NPO pending speech and swallow  - GI Prophylaxis: Not indicated  - Activity: AAT  - Code Status: Full code     Dispo: pending burn consult. Physiatry. PT/OT      Attending attestation (09/07/20):    # Stage 2 pressure ulcer to sacrum  # Un-stageable to left heel  - Burn following: no intervention; daily dressing    # Alzheimer's Disease  # Failure to Thrive  - will get palliative

## 2020-09-08 VITALS
RESPIRATION RATE: 18 BRPM | DIASTOLIC BLOOD PRESSURE: 70 MMHG | HEART RATE: 99 BPM | SYSTOLIC BLOOD PRESSURE: 155 MMHG | TEMPERATURE: 98 F

## 2020-09-08 PROCEDURE — 99221 1ST HOSP IP/OBS SF/LOW 40: CPT

## 2020-09-08 PROCEDURE — 99239 HOSP IP/OBS DSCHRG MGMT >30: CPT

## 2020-09-08 PROCEDURE — 99497 ADVNCD CARE PLAN 30 MIN: CPT | Mod: 25

## 2020-09-08 RX ORDER — COLLAGENASE CLOSTRIDIUM HIST. 250 UNIT/G
1 OINTMENT (GRAM) TOPICAL
Qty: 2 | Refills: 0
Start: 2020-09-08 | End: 2020-10-07

## 2020-09-08 RX ADMIN — Medication 1 APPLICATION(S): at 05:38

## 2020-09-08 RX ADMIN — Medication 1 APPLICATION(S): at 17:21

## 2020-09-08 RX ADMIN — ENOXAPARIN SODIUM 40 MILLIGRAM(S): 100 INJECTION SUBCUTANEOUS at 11:31

## 2020-09-08 RX ADMIN — Medication 1 APPLICATION(S): at 05:37

## 2020-09-08 NOTE — CONSULT NOTE ADULT - ASSESSMENT
Consult Summary    Patient is a 87 yr old woman with dementia, nonverbal at baseline receiving 24 H care at home here for evaluation and management of sacral and L heel pressure ulcers.     Morphine Equivalent Daily Dose (MEDD):       ____________ minutes spent discusssing Advance Care Planning.     Recommendations:          Please Call [phone] PRN Consult Summary    Patient is a 87 yr old woman with advanced dementia, nonverbal at baseline receiving 24 H care at home here for evaluation and management of sacral and L heel pressure ulcers, being managed by burn team who recommends wound care and offset pressure.   She appears comfortable at this time.   Given her presentation and history from daughter, her dementia appears to be progressing.  Spoke in detail with daughterSarahi about options and big picture for patient given her advanced dementia (see separate goals of care note). She was made DNR/DNI.     Morphine Equivalent Daily Dose (MEDD): 0 mg      30 minutes spent discussing Advance Care Planning.     Recommendations:  DNR/DNI  Informational hospice consult to discuss options   Comfort feeds per daughters preference (no artificial means of nutrition)- will ask speech and swallow to reasses and make recommendations   Ongoing medical management for now  Palliative will follow       Please Call [phone] PRN

## 2020-09-08 NOTE — PROGRESS NOTE ADULT - SUBJECTIVE AND OBJECTIVE BOX
KEVIN LEVIN 87y Female  MRN#: [id number]   CODE STATUS:DNR/DNI    SUBJECTIVE    Patient is a 87y old woman with advanced Alzheimer's (nonverbal at baseline for over 2 years) brought in for sacral ulcers of the sacrum and left heel.     Patient seen by palliative care today; daughter was in room for discussion. Decision made to make patient DNR/DNI and MOLST updated. Family decided on no feeding interventions and have opted for comfort feeds. Decision to be made about placement; home vs rehab.     OBJECTIVE  PAST MEDICAL & SURGICAL HISTORY  Dementia  High blood cholesterol    ALLERGIES:  penicillins (Other)    MEDICATIONS:  STANDING MEDICATIONS  collagenase Ointment 1 Application(s) Topical two times a day  enoxaparin Injectable 40 milliGRAM(s) SubCutaneous daily  silver sulfADIAZINE 1% Cream 1 Application(s) Topical two times a day    PRN MEDICATIONS      VITAL SIGNS: Last 24 Hours  T(C): 36.7 (08 Sep 2020 12:37), Max: 36.7 (08 Sep 2020 12:37)  T(F): 98 (08 Sep 2020 12:37), Max: 98 (08 Sep 2020 12:37)  HR: 99 (08 Sep 2020 12:37) (77 - 99)  BP: 155/70 (08 Sep 2020 12:37) (155/70 - 167/72)  BP(mean): --  RR: 18 (08 Sep 2020 12:37) (18 - 18)  SpO2: 98% (07 Sep 2020 20:59) (98% - 99%)    LABS:                        12.6   9.80  )-----------( 310      ( 07 Sep 2020 05:27 )             42.6     09-06    140  |  103  |  19  ----------------------------<  147<H>  4.3   |  27  |  0.7    Ca    9.4      06 Sep 2020 19:43    TPro  6.9  /  Alb  3.6  /  TBili  0.3  /  DBili  x   /  AST  31  /  ALT  16  /  AlkPhos  161<H>  09-06    PT/INR - ( 06 Sep 2020 19:43 )   PT: 11.80 sec;   INR: 1.03 ratio         PTT - ( 06 Sep 2020 19:43 )  PTT:35.0 sec          CARDIAC MARKERS ( 06 Sep 2020 19:43 )  x     / <0.01 ng/mL / x     / x     / x          RADIOLOGY:    < from: Xray Foot AP + Lateral + Oblique, Left (09.06.20 @ 20:58) >  Impression:    No definite soft tissue air or osseous erosion.      < end of copied text >      PHYSICAL EXAM:    GENERAL: NAD,  AAOx0, laying on right side  HEENT:  Atraumatic, Normocephalic. EOMI, PERRLA, conjunctiva and sclera clear, No JVD  PULMONARY: Clear to auscultation bilaterally; No wheeze  CARDIOVASCULAR: Regular rate and rhythm; No murmurs, rubs, or gallops  GASTROINTESTINAL: Soft, Nontender, Nondistended; Bowel sounds present  MUSCULOSKELETAL:  2+ Peripheral Pulses, No clubbing, cyanosis, or edema  NEUROLOGY: non-focal  SKIN: No rashes or lesions        ASSESSMENT & PLAN    Patient is an 88 y/o woman with PMH of very advanced Alzheimer's and HLD brought in for left heel and sacral ulcers. Patient with difficulty to thrive; unable to feed self or speak.    1. Ulcer of sacrum/left heel  -Stage 2 pressure ulcer of sacrum  -Silvadene/DPD BID to sacrum  -Unstageable ulcer of left heel  -Santyl/wet gauze with NS/Kerlex BID  -Burn following; no intervention    2. Alzheimer's dementia  -Patient non-verbal for 2 years  -Patient with difficulty feeding herself  -Speech and swallow assessed; recommend no feeding due to patient' mental state  -Palliative team consulted; spoke with daughter, patient DNR/DNI  -No feeding interventions as per family  -Decision on placement to be made (home vs rehab)  -Family wants comfort feeds      #Misc  - DVT Prophylaxis: Lovenox  - Diet: NPO   - GI Prophylaxis: Not indicated  - Activity: AAT  - Code Status: Full code

## 2020-09-08 NOTE — GOALS OF CARE CONVERSATION - ADVANCED CARE PLANNING - CONVERSATION DETAILS
Spoke with daughter, Sarahi regarding her mothers condition. We discussed options including code status, hospice options. We discussed the significance behind code status and what that indicates. Currently the patient is living at home with 24 H care from aids/nurses. We discussed the possibility of alternate means of nutrition should her mom not be able to eat/drink. We discussed this is the natural course of advanced dementia, with decreased PO intake, and declining functional status.

## 2020-09-08 NOTE — DISCHARGE NOTE PROVIDER - HOSPITAL COURSE
Patient is a 87y old woman with advanced Alzheimer's (nonverbal at baseline for over 2 years) brought in for sacral ulcers of the sacrum and left heel. Burn consulted; no intervention. Wound care applied to ulcers. Patient with failure to thrive in hospital; inable to feed herself and does not ambulate. Patient seen by palliative care today; daughter was in room for discussion. Decision made to make patient DNR/DNI and MOLST updated. Family decided on no feeding interventions and have opted for comfort feeds. Patient for home hospice.

## 2020-09-08 NOTE — DISCHARGE NOTE PROVIDER - CARE PROVIDER_API CALL
Cesilia Fischer  INTERNAL MEDICINE  [address] [place] [id number]  Phone: [phone]  Fax: [phone]  Follow Up Time: 1 week

## 2020-09-08 NOTE — DISCHARGE NOTE NURSING/CASE MANAGEMENT/SOCIAL WORK - PATIENT PORTAL LINK FT
You can access the FollowMyHealth Patient Portal offered by [facility] by registering at the following website: [email or web address]. By joining [facility]’s FollowMyHealth portal, you will also be able to view your health information using other applications (apps) compatible with our system.

## 2020-09-08 NOTE — DISCHARGE NOTE PROVIDER - NSDCMRMEDTOKEN_GEN_ALL_CORE_FT
collagenase 250 units/g topical ointment: 1 application topically 2 times a day  silver sulfADIAZINE 1% topical cream: 1 application topically 2 times a day

## 2020-09-08 NOTE — GOALS OF CARE CONVERSATION - ADVANCED CARE PLANNING - WHAT MATTERS MOST
keeping mom comfortable  keeping mom at home if possible  making sure mom has the level of care that she requires at this point

## 2020-09-08 NOTE — CONSULT NOTE ADULT - SUBJECTIVE AND OBJECTIVE BOX
REQUESTED OF: DR. JO    Chart reviewed, Hospital Day 3    KEVIN LEVIN 87yFemale  HPI:    86 YO F with PMHx of HLD and alzheimers, nonverbal at baseline, brought in by family for pressure ulcers on sacrum and left heel. History obtained from daughter over the phone. Patient lives at home with 24/7 aide. The aide noticed ulcers on her L heel and buttocks a few days ago, but are unsure of exactly when they started. Daughter also reports not being satisfied with the aide and wants better placement for her mother.   As per daughter no fevers, diarrhea, hematuria, hematochezia, melena, or changes to mental status.  In the ED vitals were /72 mmHg,  bpm, temp 97.4. (06 Sep 2020 23:31)      PAST MEDICAL & SURGICAL HISTORY:  Dementia  High blood cholesterol      Subjective and Objective:  Today,  Discussed with resident Dr. Ratliff    Focused Palliative Care Evaluation:                   Symptoms:                                      Pain                                     Dyspnea                                     N/V                                     Appetite                                     Anxiety                                     Other _____________________                     Support Devices:              PHYSICAL EXAM:      Constitutional:    Eyes:    ENMT:    Neck:    Breasts:    Back:    Respiratory:    Cardiovascular:    Gastrointestinal:    Genitourinary:    Rectal:    Extremities:    Vascular:    Neurological:    Skin:    Lymph Nodes:    Musculoskeletal:    Psychiatric:        T(C): 36.1, Max: 36.6 (15:57)  HR: 94 (77 - 94)  BP: 161/68 (161/65 - 167/72)  RR: 18 (18 - 18)  SpO2: 98% (98% - 99%)      LABS/STUDIES:  09-06    140  |  103  |  19  ----------------------------<  147<H>  4.3   |  27  |  0.7    Ca    9.4      06 Sep 2020 19:43    TPro  6.9  /  Alb  3.6  /  TBili  0.3  /  DBili  x   /  AST  31  /  ALT  16  /  AlkPhos  161<H>  09-06                            12.6   9.80  )-----------( 310      ( 07 Sep 2020 05:27 )             42.6       MEDICATIONS  (STANDING):  collagenase Ointment 1 Application(s) Topical two times a day  enoxaparin Injectable 40 milliGRAM(s) SubCutaneous daily  silver sulfADIAZINE 1% Cream 1 Application(s) Topical two times a day  sodium chloride 0.9%. 1000 milliLiter(s) (60 mL/Hr) IV Continuous <Continuous>    MEDICATIONS  (PRN):          iStop:         PPS  Level    __________       Note PPS = Palliative Performance Scale; (c)2001, [facility] Society       Range from 100% meaning Full ambulation/self-care/intake/Level of Consicous                                                                              to        10% meaning Bedbound/Unable to do any activity/extensive disease /Total Care/ No PO intake/ LOC=Full/drowsy/+/-confusion        (0% = death)                     Prior to acute illness, patient's functionality reportedly REQUESTED OF: DR. JO    Chart reviewed, Hospital Day 3    KEVIN LEVIN 87y Female  HPI:    88 YO F with PMHx of HLD and alzheimers, nonverbal at baseline, brought in by family for pressure ulcers on sacrum and left heel. History obtained from daughter over the phone. Patient lives at home with 24/7 aide. The aide noticed ulcers on her L heel and buttocks a few days ago, but are unsure of exactly when they started. Daughter also reports not being satisfied with the aide and wants better placement for her mother.   As per daughter no fevers, diarrhea, hematuria, hematochezia, melena, or changes to mental status.  In the ED vitals were /72 mmHg,  bpm, temp 97.4. (06 Sep 2020 23:31)      PAST MEDICAL & SURGICAL HISTORY:  Dementia  High blood cholesterol      Subjective and Objective:  Today,  Discussed with resident Dr. Ratliff    Focused Palliative Care Evaluation:  Patient nonverbal, unable to respond to questions.   Spoke with daughter, Sarahi, and niece. per daughter, she has 24 H aid at home, but aid changed to a different one a few weeks ago. This is when she stopped eating much for the new temporary aide who has less medical training. per daughter, she is nonverbal, sometimes alert and awake, other days sleeps all day. She does not get up.                   Symptoms:                                      Pain                                     Dyspnea                                     N/V                                     Appetite- poor per daughter since the past few weeks                                     Anxiety                                     Other _____________________                     Support Devices:              PHYSICAL EXAM:      Constitutional: NAD, sleeping in bed, lethargic, appears stated age, somewhat contracted in fetal position    Eyes: Opens to noise, tracks     Respiratory: No distress, no accessory muscle usage    Cardiovascular: no swelling     Gastrointestinal: soft, nondistended    Genitourinary: john     Extremities: moves spontaneously, clean bandage on L heel     Vascular:    Neurological:    Skin:    Lymph Nodes:    Musculoskeletal:    Psychiatric:        T(C): 36.1, Max: 36.6 (15:57)  HR: 94 (77 - 94)  BP: 161/68 (161/65 - 167/72)  RR: 18 (18 - 18)  SpO2: 98% (98% - 99%)      LABS/STUDIES:  09-06    140  |  103  |  19  ----------------------------<  147<H>  4.3   |  27  |  0.7    Ca    9.4      06 Sep 2020 19:43    TPro  6.9  /  Alb  3.6  /  TBili  0.3  /  DBili  x   /  AST  31  /  ALT  16  /  AlkPhos  161<H>  09-06                            12.6   9.80  )-----------( 310      ( 07 Sep 2020 05:27 )             42.6       MEDICATIONS  (STANDING):  collagenase Ointment 1 Application(s) Topical two times a day  enoxaparin Injectable 40 milliGRAM(s) SubCutaneous daily  silver sulfADIAZINE 1% Cream 1 Application(s) Topical two times a day  sodium chloride 0.9%. 1000 milliLiter(s) (60 mL/Hr) IV Continuous <Continuous>    MEDICATIONS  (PRN):          iStop:         PPS  Level    __________       Note PPS = Palliative Performance Scale; (c)2001, [facility] Society       Range from 100% meaning Full ambulation/self-care/intake/Level of Consicous                                                                              to        10% meaning Bedbound/Unable to do any activity/extensive disease /Total Care/ No PO intake/ LOC=Full/drowsy/+/-confusion        (0% = death)                     Prior to acute illness, patient's functionality reportedly REQUESTED OF: DR. JO    Chart reviewed, Hospital Day 3    KEVIN LEVIN 87y Female  HPI:    88 YO F with PMHx of HLD and alzheimers, nonverbal at baseline, brought in by family for pressure ulcers on sacrum and left heel. History obtained from daughter over the phone. Patient lives at home with 24/7 aide. The aide noticed ulcers on her L heel and buttocks a few days ago, but are unsure of exactly when they started. Daughter also reports not being satisfied with the aide and wants better placement for her mother.   As per daughter no fevers, diarrhea, hematuria, hematochezia, melena, or changes to mental status.  In the ED vitals were /72 mmHg,  bpm, temp 97.4. (06 Sep 2020 23:31)      PAST MEDICAL & SURGICAL HISTORY:  Dementia  High blood cholesterol      Subjective and Objective:  Today,  Discussed with resident Dr. Beasley    Focused Palliative Care Evaluation:  Patient nonverbal, unable to respond to questions.   Spoke with daughter, Sarahi, and niece. per daughter, she has 24 H aid at home, but aid changed to a different one a few weeks ago. This is when she stopped eating much for the new temporary aide who has less medical training. per daughter, she is nonverbal, sometimes alert and awake, other days sleeps all day. She does not get up.                   Symptoms:                                      Pain                                     Dyspnea                                     N/V                                     Appetite- poor per daughter since the past few weeks                                     Anxiety                                     Other _____________________                     Support Devices:              PHYSICAL EXAM:      Constitutional: NAD, sleeping in bed, lethargic, appears stated age, somewhat contracted in fetal position    Eyes: Opens to noise, tracks     Respiratory: No distress, no accessory muscle usage    Cardiovascular: no swelling     Gastrointestinal: soft, nondistended    Genitourinary: john in place    Extremities: moves spontaneously, clean bandage on L heel     Neurological: Awakens to stimuli, not oriented.     Skin: see nursing assessment (pressure sore on coccyx, L heel)    Musculoskeletal: somewhat contracted extremities        T(C): 36.1, Max: 36.6 (15:57)  HR: 94 (77 - 94)  BP: 161/68 (161/65 - 167/72)  RR: 18 (18 - 18)  SpO2: 98% (98% - 99%)      LABS/STUDIES:  09-06    140  |  103  |  19  ----------------------------<  147<H>  4.3   |  27  |  0.7    Ca    9.4      06 Sep 2020 19:43    TPro  6.9  /  Alb  3.6  /  TBili  0.3  /  DBili  x   /  AST  31  /  ALT  16  /  AlkPhos  161<H>  09-06                            12.6   9.80  )-----------( 310      ( 07 Sep 2020 05:27 )             42.6       MEDICATIONS  (STANDING):  collagenase Ointment 1 Application(s) Topical two times a day  enoxaparin Injectable 40 milliGRAM(s) SubCutaneous daily  silver sulfADIAZINE 1% Cream 1 Application(s) Topical two times a day  sodium chloride 0.9%. 1000 milliLiter(s) (60 mL/Hr) IV Continuous <Continuous>    MEDICATIONS  (PRN):          iStop: This report was requested by: Karen Benoit | Reference #: [id number]         PPS  Level    20%       Note PPS = Palliative Performance Scale; (c)2001, [facility] Society       Range from 100% meaning Full ambulation/self-care/intake/Level of Consicous                                                                              to        10% meaning Bedbound/Unable to do any activity/extensive disease /Total Care/ No PO intake/ LOC=Full/drowsy/+/-confusion        (0% = death)                     Prior to acute illness, patient's functionality reportedly 20%. Around the clock care with 24 H aid. Unable to perform any ADLs herself, bed bound.

## 2020-09-08 NOTE — DISCHARGE NOTE PROVIDER - NSDCCPCAREPLAN_GEN_ALL_CORE_FT
PRINCIPAL DISCHARGE DIAGNOSIS  Diagnosis: Sacral decubitus ulcer, stage II  Assessment and Plan of Treatment: You came in with ulcers on your left heel and buttocks. We prescribed you cream for wound care. Please apply the silvadene cream to the buttocks twice a day with covering. Please apply the santyl to the left heel with covering.

## 2020-09-08 NOTE — PROGRESS NOTE ADULT - SUBJECTIVE AND OBJECTIVE BOX
POONAMKEVIN  87y  Female  ***My note supersedes ALL resident notes that I sign.  My corrections for their notes are in my note.***    I can be reached directly on [email or web address]. My office number is [phone]. My personal cell number is [phone].    INTERVAL EVENTS: Here for f/u of dementia. Pt was sent in for wound care. Pall saw pt and spoke w/ family - no heroic measures. Family would like pt home - seems reasonable.    T(F): 98 (09-08-20 @ 12:37), Max: 98 (09-08-20 @ 12:37)  HR: 99 (09-08-20 @ 12:37) (77 - 99)  BP: 155/70 (09-08-20 @ 12:37) (155/70 - 167/72)  RR: 18 (09-08-20 @ 12:37) (18 - 18)  SpO2: 98% (09-07-20 @ 20:59) (98% - 98%)    Gen: NAD; sleeping; non-verbal at baseline  Neck: no nodes, no JVD, thyroid nl  lungs: clr  hrt: s1 s2 rrr  abd: soft, NT/ND  ext: no edema, no c/c    LABS:                      12.6    (    94.7   9.80  )-----------( ---------      310      ( 07 Sep 2020 05:27 )             42.6    (    13.9     PT/INR - ( 06 Sep 2020 19:43 )   PT: 11.80 sec;   INR: 1.03 ratio    PTT - ( 06 Sep 2020 19:43 )  PTT:35.0 sec    CARDIAC MARKERS ( 06 Sep 2020 19:43 )  x     / <0.01 ng/mL / x     / x     / x        RADIOLOGY & ADDITIONAL TESTS:      MEDICATIONS:    collagenase Ointment 1 Application(s) Topical two times a day  enoxaparin Injectable 40 milliGRAM(s) SubCutaneous daily  silver sulfADIAZINE 1% Cream 1 Application(s) Topical two times a day

## 2020-09-08 NOTE — PROGRESS NOTE ADULT - ASSESSMENT
86 YO F with PMHx of HLD and alzheimers, nonverbal at baseline, brought in by family for pressure ulcers on sacrum and left heel and for placement.    # Stage 2 pressure ulcer to sacrum; Un-stageable to left heel - both present on admission  c/w Silvadene/ DPD BID to sacrum  c/w Santyl/ wet gauze with NS/ Kerlex BID to left Heel    # Alzheimer Dementia - very advanced; non-verbal; failure to thrive  Not on any home meds    # DLD - Not on any meds    # DVT Prophylaxis: Lovenox    # GI Prophylaxis: Not indicated    # Activity: AAT    # GOC: pall spoke w/ fam - no heroic measures; no labs/studies; no PEG; comfort feeds only    # Code Status: DNR/I    Dispo: wound care; comfort care; comfort feeds; d/c home today - pt has pvt hire aides    Prognosis is extremely poor.

## 2020-09-11 DIAGNOSIS — E78.00 PURE HYPERCHOLESTEROLEMIA, UNSPECIFIED: ICD-10-CM

## 2020-09-11 DIAGNOSIS — L89.152 PRESSURE ULCER OF SACRAL REGION, STAGE 2: ICD-10-CM

## 2020-09-11 DIAGNOSIS — Z51.5 ENCOUNTER FOR PALLIATIVE CARE: ICD-10-CM

## 2020-09-11 DIAGNOSIS — R62.7 ADULT FAILURE TO THRIVE: ICD-10-CM

## 2020-09-11 DIAGNOSIS — G30.9 ALZHEIMER'S DISEASE, UNSPECIFIED: ICD-10-CM

## 2020-09-11 DIAGNOSIS — Z88.0 ALLERGY STATUS TO PENICILLIN: ICD-10-CM

## 2020-09-11 DIAGNOSIS — L89.629 PRESSURE ULCER OF LEFT HEEL, UNSPECIFIED STAGE: ICD-10-CM

## 2020-09-11 DIAGNOSIS — F02.80 DEMENTIA IN OTHER DISEASES CLASSIFIED ELSEWHERE, UNSPECIFIED SEVERITY, WITHOUT BEHAVIORAL DISTURBANCE, PSYCHOTIC DISTURBANCE, MOOD DISTURBANCE, AND ANXIETY: ICD-10-CM

## 2020-09-11 DIAGNOSIS — Z66 DO NOT RESUSCITATE: ICD-10-CM

## 2020-09-11 DIAGNOSIS — E78.5 HYPERLIPIDEMIA, UNSPECIFIED: ICD-10-CM

## 2022-12-08 NOTE — ED ADULT NURSE NOTE - SUICIDE SCREENING QUESTION 2
Patient unable to complete Muscle Hinge Flap Text: The defect edges were debeveled with a #15 scalpel blade.  Given the size, depth and location of the defect and the proximity to free margins a muscle hinge flap was deemed most appropriate.  Using a sterile surgical marker, an appropriate hinge flap was drawn incorporating the defect. The area thus outlined was incised with a #15 scalpel blade.  The skin margins were undermined to an appropriate distance in all directions utilizing iris scissors.

## 2023-07-25 NOTE — ED PROVIDER NOTE - CADM POA PRESS ULCER STAGE
stage II
Assistance OOB with selected safe patient handling equipment if applicable/Assistance with ambulation/Communicate fall risk and risk factors to all staff, patient, and family/Monitor gait and stability/Monitor for mental status changes and reorient to person, place, and time, as needed/Provide visual cue: yellow wristband, yellow gown, etc/Reinforce activity limits and safety measures with patient and family/Toileting schedule using arm’s reach rule for commode and bathroom/Use of alarms - bed, stretcher, chair and/or video monitoring/Call bell, personal items and telephone in reach/Instruct patient to call for assistance before getting out of bed/chair/stretcher/Non-slip footwear applied when patient is off stretcher/[place] to call system/Physically safe environment - no spills, clutter or unnecessary equipment/Purposeful Proactive Rounding/Room/bathroom lighting operational, light cord in reach
